# Patient Record
Sex: FEMALE | Race: WHITE | NOT HISPANIC OR LATINO
[De-identification: names, ages, dates, MRNs, and addresses within clinical notes are randomized per-mention and may not be internally consistent; named-entity substitution may affect disease eponyms.]

---

## 2021-06-25 PROBLEM — Z00.00 ENCOUNTER FOR PREVENTIVE HEALTH EXAMINATION: Status: ACTIVE | Noted: 2021-06-25

## 2021-06-29 ENCOUNTER — APPOINTMENT (OUTPATIENT)
Dept: ENDOCRINOLOGY | Facility: CLINIC | Age: 26
End: 2021-06-29
Payer: COMMERCIAL

## 2021-06-29 VITALS
WEIGHT: 177 LBS | BODY MASS INDEX: 32.57 KG/M2 | DIASTOLIC BLOOD PRESSURE: 80 MMHG | HEART RATE: 72 BPM | HEIGHT: 62 IN | SYSTOLIC BLOOD PRESSURE: 122 MMHG | OXYGEN SATURATION: 98 %

## 2021-06-29 DIAGNOSIS — Z83.438 FAMILY HISTORY OF OTHER DISORDER OF LIPOPROTEIN METABOLISM AND OTHER LIPIDEMIA: ICD-10-CM

## 2021-06-29 DIAGNOSIS — F41.9 ANXIETY DISORDER, UNSPECIFIED: ICD-10-CM

## 2021-06-29 DIAGNOSIS — L91.0 HYPERTROPHIC SCAR: ICD-10-CM

## 2021-06-29 DIAGNOSIS — Z86.59 PERSONAL HISTORY OF OTHER MENTAL AND BEHAVIORAL DISORDERS: ICD-10-CM

## 2021-06-29 DIAGNOSIS — Z82.49 FAMILY HISTORY OF ISCHEMIC HEART DISEASE AND OTHER DISEASES OF THE CIRCULATORY SYSTEM: ICD-10-CM

## 2021-06-29 DIAGNOSIS — Z87.2 PERSONAL HISTORY OF DISEASES OF THE SKIN AND SUBCUTANEOUS TISSUE: ICD-10-CM

## 2021-06-29 DIAGNOSIS — Z86.79 PERSONAL HISTORY OF OTHER DISEASES OF THE CIRCULATORY SYSTEM: ICD-10-CM

## 2021-06-29 DIAGNOSIS — L90.6 STRIAE ATROPHICAE: ICD-10-CM

## 2021-06-29 DIAGNOSIS — N92.6 IRREGULAR MENSTRUATION, UNSPECIFIED: ICD-10-CM

## 2021-06-29 PROCEDURE — 99204 OFFICE O/P NEW MOD 45 MIN: CPT

## 2021-06-29 PROCEDURE — 99072 ADDL SUPL MATRL&STAF TM PHE: CPT

## 2021-06-29 NOTE — HISTORY OF PRESENT ILLNESS
[FreeTextEntry1] : Ms. SIMPSON  is a 25 year old  female who presents for initial endocrine evaluation. She presents with regard to a history of acne on chest and back, stretch marks abdominal, per patient did not occur in the setting of weight loss, hair growth upper, face, hair loss. Did see dermatologist however going for second opinion. Was on Kenalog injection for 3 years. No longer on Kenalog since the past 3 months. \par Too, hx of hidradenitis suppurativa. \par Reports hair growth is now slow, and somewhat less hair shedding. \par Reports keloid marks where acne had developed. \par Pt using cocoa butter for marks. \par LMP: IUD inserted 04/08/2021, had menses the following week.  \par \par Denies COVID infection, however was sick in December 2019. Received COVID vaccine. \par Reports 15 lb weight loss over the last 4-5 months, had cut down on pasta and biking regularly. \par \par Reports cystic ovaries, largest 5 cm.\par On Kyleena IUD - no menses, however does report that once she had cystic ovaries, prior to that irregular menstruation for the past 2 years. Did perform androgen labs, however negative. Saw Dr. Vazquez endocrinology, cushing blood work, Dexamethasone testing performed in Feb 2021, negative.\par \par Additional medical history includes that of Elevated WBC (counts consistently elevated for about 3 years), sees Dr. Avendaño ([facility]), Hidradenitis suppurative, HTN, Anxiety. Medications include Edarbi 20 mg daily, Metoprolol 25 mg daily, Lexapro 5 mg daily, Onyxton Topical.\par Pt has been taking antihypertensives since the past 3 years, attributing to poor diet [had ramen for all 3 meals] while in school. \par Not taking vitamins. \par \par FHx HTN (mother, father), HLD (mother), BCC ( mother), Pacemaker (mother). Denies fhx of tendency of keloids. \par Did see cardiology Dr. Kristopher Diaz in [place].

## 2021-07-08 LAB
25(OH)D3 SERPL-MCNC: 29.5 NG/ML
ALBUMIN SERPL ELPH-MCNC: 4.1 G/DL
ALDOSTERONE SERUM: 21.9 NG/DL
ALP BLD-CCNC: 73 U/L
ALT SERPL-CCNC: 10 U/L
ANION GAP SERPL CALC-SCNC: 14 MMOL/L
AST SERPL-CCNC: 13 U/L
BASOPHILS # BLD AUTO: 0.06 K/UL
BASOPHILS NFR BLD AUTO: 0.5 %
BILIRUB SERPL-MCNC: 0.2 MG/DL
BUN SERPL-MCNC: 12 MG/DL
CALCIUM SERPL-MCNC: 9.8 MG/DL
CHLORIDE SERPL-SCNC: 103 MMOL/L
CHOLEST SERPL-MCNC: 145 MG/DL
CO2 SERPL-SCNC: 21 MMOL/L
CORTIS SERPL-MCNC: 13.5 UG/DL
CORTIS SERPL-MCNC: 13.9 UG/DL
CREAT SERPL-MCNC: 0.69 MG/DL
DHEA-S SERPL-MCNC: 80 UG/DL
EOSINOPHIL # BLD AUTO: 0.29 K/UL
EOSINOPHIL NFR BLD AUTO: 2.3 %
ESTRADIOL SERPL-MCNC: 311 PG/ML
FERRITIN SERPL-MCNC: 103 NG/ML
FOLATE SERPL-MCNC: 5 NG/ML
FSH SERPL-MCNC: 3.1 IU/L
GLUCOSE SERPL-MCNC: 91 MG/DL
HCT VFR BLD CALC: 42.8 %
HDLC SERPL-MCNC: 36 MG/DL
HGB BLD-MCNC: 13.6 G/DL
IMM GRANULOCYTES NFR BLD AUTO: 0.3 %
INSULIN SERPL-MCNC: 23.9 UU/ML
IRON SERPL-MCNC: 35 UG/DL
LDLC SERPL CALC-MCNC: 78 MG/DL
LH SERPL-ACNC: 5.6 IU/L
LYMPHOCYTES # BLD AUTO: 3.37 K/UL
LYMPHOCYTES NFR BLD AUTO: 26.9 %
MAGNESIUM SERPL-MCNC: 2.2 MG/DL
MAN DIFF?: NORMAL
MCHC RBC-ENTMCNC: 28.7 PG
MCHC RBC-ENTMCNC: 31.8 GM/DL
MCV RBC AUTO: 90.3 FL
MONOCYTES # BLD AUTO: 1.06 K/UL
MONOCYTES NFR BLD AUTO: 8.4 %
NEUTROPHILS # BLD AUTO: 7.73 K/UL
NEUTROPHILS NFR BLD AUTO: 61.6 %
NONHDLC SERPL-MCNC: 108 MG/DL
PLATELET # BLD AUTO: 344 K/UL
POTASSIUM SERPL-SCNC: 4.8 MMOL/L
PROLACTIN SERPL-MCNC: 37.3 NG/ML
PROT SERPL-MCNC: 6.9 G/DL
RBC # BLD: 4.74 M/UL
RBC # FLD: 13.1 %
SHBG SERPL-SCNC: 36.2 NMOL/L
SODIUM SERPL-SCNC: 138 MMOL/L
TESTOST BND SERPL-MCNC: 2 PG/ML
TESTOSTERONE TOTAL S: 10 NG/DL
TRIGL SERPL-MCNC: 151 MG/DL
VIT B12 SERPL-MCNC: 314 PG/ML
WBC # FLD AUTO: 12.55 K/UL

## 2021-07-09 LAB — 17OHP SERPL-MCNC: 58 NG/DL

## 2021-07-30 ENCOUNTER — NON-APPOINTMENT (OUTPATIENT)
Age: 26
End: 2021-07-30

## 2021-08-24 ENCOUNTER — NON-APPOINTMENT (OUTPATIENT)
Age: 26
End: 2021-08-24

## 2021-09-07 LAB
CORTICOSTEROID BIND GLOBULIN: 1.7 MG/DL
CORTICOSTEROID BIND GLOBULIN: 1.7 MG/DL
CORTIS 24H UR-MCNC: 24 H
CORTIS 24H UR-MRATE: 10 MCG/24 H
CORTIS SERPL-MCNC: 16 UG/DL
CORTISOL, FREE: 5.1 UG/DL
CREAT 24H UR-MCNC: 1.2 G/24 H
CREAT ?TM UR-MCNC: 93 MG/DL
METANEPHRINE, PL: 12.3 PG/ML
NORMETANEPHRINE, PL: 104.4 PG/ML
PFCX: 32 %
PROT ?TM UR-MCNC: 24 HR
RENIN ACTIVITY, PLASMA: 49.57 NG/ML/HR
SPECIMEN VOL 24H UR: 1250 ML
SPECIMEN VOL 24H UR: 1250 ML

## 2021-09-29 ENCOUNTER — APPOINTMENT (OUTPATIENT)
Dept: ENDOCRINOLOGY | Facility: CLINIC | Age: 26
End: 2021-09-29
Payer: COMMERCIAL

## 2021-09-29 VITALS
HEIGHT: 62 IN | DIASTOLIC BLOOD PRESSURE: 78 MMHG | WEIGHT: 182 LBS | TEMPERATURE: 98.1 F | HEART RATE: 75 BPM | SYSTOLIC BLOOD PRESSURE: 122 MMHG | OXYGEN SATURATION: 98 % | BODY MASS INDEX: 33.49 KG/M2

## 2021-09-29 DIAGNOSIS — E55.9 VITAMIN D DEFICIENCY, UNSPECIFIED: ICD-10-CM

## 2021-09-29 DIAGNOSIS — E22.1 HYPERPROLACTINEMIA: ICD-10-CM

## 2021-09-29 DIAGNOSIS — E53.8 DEFICIENCY OF OTHER SPECIFIED B GROUP VITAMINS: ICD-10-CM

## 2021-09-29 DIAGNOSIS — E66.3 OVERWEIGHT: ICD-10-CM

## 2021-09-29 DIAGNOSIS — I10 ESSENTIAL (PRIMARY) HYPERTENSION: ICD-10-CM

## 2021-09-29 DIAGNOSIS — E28.2 POLYCYSTIC OVARIAN SYNDROME: ICD-10-CM

## 2021-09-29 DIAGNOSIS — L65.9 NONSCARRING HAIR LOSS, UNSPECIFIED: ICD-10-CM

## 2021-09-29 PROCEDURE — 99214 OFFICE O/P EST MOD 30 MIN: CPT | Mod: 25

## 2021-09-29 PROCEDURE — 36415 COLL VENOUS BLD VENIPUNCTURE: CPT

## 2021-09-30 PROBLEM — E28.2 POLYCYSTIC OVARIAN DISEASE: Status: ACTIVE | Noted: 2021-09-29

## 2021-09-30 NOTE — HISTORY OF PRESENT ILLNESS
[FreeTextEntry1] : Ms. SIMPSON is a 26 year old female who returns for endocrine reevaluation. She presents with regard to a history of acne on chest and back, stretch marks abdominal, per patient did not occur in the setting of weight loss, hair growth upper, face, hair loss. Did see dermatologist however going for second opinion. Was on Kenalog injection for 3 years. No longer on Kenalog since the past 3 months. \par \par Pituitary MRI 7/20/2021 due to hyperprolactinemia. -->Showed mild enlargement on the right side of pituitary gland suggesting a right sided pituitary micro adenoma and pt's 24 hour urinary cortisol returned normal. \par On 07/06/2021 DHEAS returned at 80.0, Total Testosterone 10.\par Too, hx of hidradenitis suppurativa. Pt now on vitamin d supplementation weekly has helped with her HS symptoms. She is also now taking a vitamin b12 supplement as advised. \par Reports hair growth is now slow, and somewhat less hair shedding. \par Reports keloid marks where acne had developed. \par Pt using cocoa butter for marks. \par LMP: IUD inserted 04/08/2021, had menses the following week. \par \par Denies COVID infection, however was sick in December 2019. Received COVID vaccine. \par Reports 15 lb weight loss over the last 4-5 months, had cut down on pasta and biking regularly. \par \par Reports cystic ovaries, largest 5 cm.\par On Kyleena IUD - no menses, however does report that once she had cystic ovaries, prior to that irregular menstruation for the past 2 years. Did perform androgen labs, however negative. Saw Dr. Vazquez endocrinology, cushing blood work, Dexamethasone testing performed in Feb 2021, negative.\par \par Additional medical history includes that of Elevated WBC (counts consistently elevated for about 3 years), sees Dr. Avendaño ([facility]), Hidradenitis suppurative, HTN, Anxiety. Medications include Edarbi 20 mg daily, Metoprolol 25 mg daily, Lexapro 5 mg daily, Onyxton Topical. Pt has been taking antihypertensives since the past 3 years, attributing to poor diet [had ramen for all 3 meals] while in school. Not taking vitamins. \par \par FHx HTN (mother, father), HLD (mother), BCC ( mother), Pacemaker (mother). Denies fhx of tendency of keloids. \par Did see cardiology Dr. Kristopher Diaz in [place].

## 2021-10-07 ENCOUNTER — RX RENEWAL (OUTPATIENT)
Age: 26
End: 2021-10-07

## 2021-11-02 LAB
25(OH)D3 SERPL-MCNC: 41 NG/ML
PROLACTIN SERPL-MCNC: 16.9 NG/ML
T3FREE SERPL-MCNC: 3.45 PG/ML
T4 FREE SERPL-MCNC: 1 NG/DL
TSH SERPL-ACNC: 2.19 UIU/ML
VIT B12 SERPL-MCNC: 712 PG/ML

## 2021-11-03 ENCOUNTER — NON-APPOINTMENT (OUTPATIENT)
Age: 26
End: 2021-11-03

## 2022-01-03 ENCOUNTER — RX RENEWAL (OUTPATIENT)
Age: 27
End: 2022-01-03

## 2022-01-03 RX ORDER — ERGOCALCIFEROL 1.25 MG/1
1.25 MG CAPSULE, LIQUID FILLED ORAL
Qty: 12 | Refills: 0 | Status: ACTIVE | COMMUNITY
Start: 2021-07-08 | End: 1900-01-01

## 2022-07-05 ENCOUNTER — APPOINTMENT (OUTPATIENT)
Dept: OTOLARYNGOLOGY | Facility: CLINIC | Age: 27
End: 2022-07-05

## 2022-07-06 ENCOUNTER — APPOINTMENT (OUTPATIENT)
Dept: OTOLARYNGOLOGY | Facility: CLINIC | Age: 27
End: 2022-07-06

## 2022-07-06 VITALS
BODY MASS INDEX: 31.04 KG/M2 | HEIGHT: 62.5 IN | WEIGHT: 173 LBS | DIASTOLIC BLOOD PRESSURE: 86 MMHG | HEART RATE: 85 BPM | SYSTOLIC BLOOD PRESSURE: 148 MMHG

## 2022-07-06 DIAGNOSIS — Z80.9 FAMILY HISTORY OF MALIGNANT NEOPLASM, UNSPECIFIED: ICD-10-CM

## 2022-07-06 DIAGNOSIS — Z86.79 PERSONAL HISTORY OF OTHER DISEASES OF THE CIRCULATORY SYSTEM: ICD-10-CM

## 2022-07-06 DIAGNOSIS — H60.503 UNSPECIFIED ACUTE NONINFECTIVE OTITIS EXTERNA, BILATERAL: ICD-10-CM

## 2022-07-06 PROCEDURE — 99213 OFFICE O/P EST LOW 20 MIN: CPT

## 2022-07-06 RX ORDER — HYDROCHLOROTHIAZIDE 25 MG/1
25 TABLET ORAL
Qty: 90 | Refills: 0 | Status: ACTIVE | COMMUNITY
Start: 2022-04-21

## 2022-07-06 RX ORDER — METOPROLOL SUCCINATE 25 MG/1
25 TABLET, EXTENDED RELEASE ORAL
Qty: 90 | Refills: 0 | Status: ACTIVE | COMMUNITY
Start: 2022-03-14

## 2022-07-06 RX ORDER — METOPROLOL SUCCINATE 200 MG/1
TABLET, EXTENDED RELEASE ORAL
Refills: 0 | Status: ACTIVE | COMMUNITY

## 2022-07-06 RX ORDER — CYCLOSPORINE 0.5 MG/ML
0.05 EMULSION OPHTHALMIC
Qty: 60 | Refills: 0 | Status: ACTIVE | COMMUNITY
Start: 2022-04-08

## 2022-07-06 RX ORDER — TELMISARTAN 80 MG/1
80 TABLET ORAL
Qty: 90 | Refills: 0 | Status: ACTIVE | COMMUNITY
Start: 2022-06-23

## 2022-07-06 RX ORDER — B-COMPLEX WITH VITAMIN C
TABLET ORAL
Refills: 0 | Status: ACTIVE | COMMUNITY

## 2022-07-06 RX ORDER — UBIDECARENONE/VIT E ACET 100MG-5
CAPSULE ORAL
Refills: 0 | Status: ACTIVE | COMMUNITY

## 2022-07-06 RX ORDER — NEOMYCIN AND POLYMYXIN B SULFATES AND HYDROCORTISONE OTIC 10; 3.5; 1 MG/ML; MG/ML; [USP'U]/ML
3.5-10000-1 SUSPENSION AURICULAR (OTIC)
Qty: 10 | Refills: 0 | Status: ACTIVE | COMMUNITY
Start: 2022-06-12

## 2022-07-06 RX ORDER — AZITHROMYCIN 250 MG/1
250 TABLET, FILM COATED ORAL
Qty: 6 | Refills: 0 | Status: ACTIVE | COMMUNITY
Start: 2022-05-19

## 2022-07-06 RX ORDER — CIPROFLOXACIN AND DEXAMETHASONE 3; 1 MG/ML; MG/ML
0.3-0.1 SUSPENSION/ DROPS AURICULAR (OTIC) TWICE DAILY
Qty: 1 | Refills: 1 | Status: ACTIVE | COMMUNITY
Start: 2022-07-06 | End: 1900-01-01

## 2022-07-06 RX ORDER — ESCITALOPRAM OXALATE 5 MG/1
TABLET, FILM COATED ORAL
Refills: 0 | Status: ACTIVE | COMMUNITY

## 2022-07-06 NOTE — PHYSICAL EXAM
[Midline] : trachea located in midline position [Normal] : no rashes [de-identified] : Minimal cerumen in bilateral EAC. Bilateral EAC inflammation. [de-identified] : 3-4+ tonsils bilaterally.

## 2022-07-06 NOTE — REVIEW OF SYSTEMS
[Ear Pain] : ear pain [Recurrent Ear Infections] : recurrent ear infections [Anxiety] : anxiety [Negative] : Heme/Lymph

## 2022-07-06 NOTE — HISTORY OF PRESENT ILLNESS
[de-identified] : Emely Cabrera is a 27 yo female who presents for evaluation of bilateral aural fullness. She states that this started after going swimming on 7/4/22. She then felt bilateral aural fullness, right worse than left, and bilateral otalgia. She denies otorrhea, tinnitus, vertigo. She feels htat her hearing is slightly diminished on the right. She denies facial weakness or facial numbness. She notes history of swimmer's ear. She notes recurrent ear infections as a child and some as an adult. She denies fevers or chills.

## 2022-07-06 NOTE — ASSESSMENT
[FreeTextEntry1] : Emely Cabrera presents for evaluation of bilateral otalgia. She has evidence of bilateral otitis externa, which she has had before. will start topical treatment as below. She has very large tonsils but denies history of recurrent tonsillitis, snoring, or pauses in breathing.\par \par - Dry ear precautions.\par - Ciprodex - 5 drops twice daily to both ears for 1 week.\par - Follow up in 2 weeks.

## 2022-07-14 ENCOUNTER — APPOINTMENT (OUTPATIENT)
Dept: OTOLARYNGOLOGY | Facility: CLINIC | Age: 27
End: 2022-07-14

## 2022-07-14 VITALS
WEIGHT: 170 LBS | BODY MASS INDEX: 31.28 KG/M2 | DIASTOLIC BLOOD PRESSURE: 102 MMHG | HEART RATE: 80 BPM | SYSTOLIC BLOOD PRESSURE: 170 MMHG | HEIGHT: 62 IN

## 2022-07-14 VITALS — DIASTOLIC BLOOD PRESSURE: 92 MMHG | SYSTOLIC BLOOD PRESSURE: 150 MMHG

## 2022-07-14 DIAGNOSIS — J31.0 CHRONIC RHINITIS: ICD-10-CM

## 2022-07-14 DIAGNOSIS — H93.8X3 OTHER SPECIFIED DISORDERS OF EAR, BILATERAL: ICD-10-CM

## 2022-07-14 PROCEDURE — 92567 TYMPANOMETRY: CPT

## 2022-07-14 PROCEDURE — 99213 OFFICE O/P EST LOW 20 MIN: CPT

## 2022-07-14 PROCEDURE — 92557 COMPREHENSIVE HEARING TEST: CPT

## 2022-07-14 NOTE — ASSESSMENT
[FreeTextEntry1] : Emely presents for follow-up. She completed ciprodex drops for bilateral otitis externa which has resolved. She notes persistent but slightly improved aural fullness with some intermittent bilateral cheek and jaw pressure. Audiogram was performed showing type A tymps AU, normal hearing AU, and bilateral eustachian tube dysfunction. Given these findings, suspect that her eustachian tube dysfunction secondary to rhinitis is leading to her aural fullness. Will start topical nasal regimen.\par \par - Start nasal saline sprays BID\par - Start flonase 2 sprays BID\par - Follow up in 1 month

## 2022-07-14 NOTE — DATA REVIEWED
[de-identified] : type A tymps AU\par ETD AU\par hearing -8000 Hz AU\par 1) ent f/u 2) re-eval as per MD

## 2022-07-14 NOTE — REVIEW OF SYSTEMS
[Ear Pain] : ear pain [Hearing Loss] : hearing loss [Sinus Pressure] : sinus pressure [Negative] : Heme/Lymph

## 2022-07-14 NOTE — PHYSICAL EXAM
[Midline] : trachea located in midline position [Normal] : no rashes [de-identified] : Resolved EAC inflammation. [de-identified] : 3-4+ tonsils bilaterally.

## 2022-07-19 ENCOUNTER — APPOINTMENT (OUTPATIENT)
Dept: OTOLARYNGOLOGY | Facility: CLINIC | Age: 27
End: 2022-07-19

## 2022-08-23 ENCOUNTER — APPOINTMENT (OUTPATIENT)
Dept: OTOLARYNGOLOGY | Facility: CLINIC | Age: 27
End: 2022-08-23

## 2022-08-23 VITALS
SYSTOLIC BLOOD PRESSURE: 131 MMHG | HEIGHT: 62 IN | DIASTOLIC BLOOD PRESSURE: 80 MMHG | BODY MASS INDEX: 32.2 KG/M2 | HEART RATE: 77 BPM | WEIGHT: 175 LBS

## 2022-08-23 DIAGNOSIS — H69.83 OTHER SPECIFIED DISORDERS OF EUSTACHIAN TUBE, BILATERAL: ICD-10-CM

## 2022-08-23 PROCEDURE — 99212 OFFICE O/P EST SF 10 MIN: CPT

## 2022-08-23 NOTE — ASSESSMENT
[FreeTextEntry1] : Emely presents for follow-up of bilateral eustachian tube dysfunction. Her aural fullness and cheek pressure are improved while using nasal saline sprays and flonase. Her rhinitis symptoms have resolved.\par \par - Flonase 2 sprays prn aural fullness.\par - Follow up prn

## 2022-08-23 NOTE — HISTORY OF PRESENT ILLNESS
[de-identified] : Emely Cabrera is a 27 yo female who presents for evaluation of bilateral aural fullness. She states that this started after going swimming on 7/4/22. She then felt bilateral aural fullness, right worse than left, and bilateral otalgia. She denies otorrhea, tinnitus, vertigo. She feels htat her hearing is slightly diminished on the right. She denies facial weakness or facial numbness. She notes history of swimmer's ear. She notes recurrent ear infections as a child and some as an adult. She denies fevers or chills. [FreeTextEntry1] : 7/14/22 - Emely presents for follow-up. She used ciprodex drops to both ears. She had slight improvement but notes that she feels pressure particularly in the right ear in the morning, but also slightly in the left ear. She intermittently develops cheek and jaw pressure, right worse than left. She feels that her hearing is diminished in the morning, but gets better during the day. She notes intermittent sharp pain bilaterally but denies otorrhea. She denies fevers or chills. She denies history of allergy testing. She denies significant nasal congestion but notes postnasal drainage. She denies recurrent sinus infections.\par \par 8/23/22 - Emely Cabrera presents for follow-up. She has been using nasal saline sprays and flonase. She notes resolution of aural fullness. She denies otalgia, otorrhea, tinnitus, vertigo, hearing change. She denies nasal congestion. She notes mild rhinorrhea. She denies postnasal drainage or sinus pressure. She denies fevers or chills.

## 2022-08-23 NOTE — PHYSICAL EXAM
[Midline] : trachea located in midline position [de-identified] : 3-4+ tonsils bilaterally. [Normal] : no rashes

## 2022-08-23 NOTE — REASON FOR VISIT
[Subsequent Evaluation] : a subsequent evaluation for [FreeTextEntry2] : eustachian tube dysfunction